# Patient Record
Sex: FEMALE | Race: WHITE | NOT HISPANIC OR LATINO | Employment: OTHER | URBAN - METROPOLITAN AREA
[De-identification: names, ages, dates, MRNs, and addresses within clinical notes are randomized per-mention and may not be internally consistent; named-entity substitution may affect disease eponyms.]

---

## 2019-04-10 ENCOUNTER — HOSPITAL ENCOUNTER (OUTPATIENT)
Dept: PHYSICAL THERAPY | Facility: HOSPITAL | Age: 69
Setting detail: THERAPIES SERIES
Discharge: HOME | End: 2019-04-10

## 2019-04-10 DIAGNOSIS — M54.5 LOW BACK PAIN, UNSPECIFIED BACK PAIN LATERALITY, UNSPECIFIED CHRONICITY, WITH SCIATICA PRESENCE UNSPECIFIED: Primary | ICD-10-CM

## 2019-04-10 PROCEDURE — 97110 THERAPEUTIC EXERCISES: CPT | Performed by: PHYSICAL THERAPIST

## 2019-04-10 PROCEDURE — 97161 PT EVAL LOW COMPLEX 20 MIN: CPT | Performed by: PHYSICAL THERAPIST
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
## 2019-04-15 ENCOUNTER — HOSPITAL ENCOUNTER (OUTPATIENT)
Dept: PHYSICAL THERAPY | Facility: HOSPITAL | Age: 69
Setting detail: THERAPIES SERIES
Discharge: HOME | End: 2019-04-15

## 2019-04-15 DIAGNOSIS — M54.5 LOW BACK PAIN, UNSPECIFIED BACK PAIN LATERALITY, UNSPECIFIED CHRONICITY, WITH SCIATICA PRESENCE UNSPECIFIED: Primary | ICD-10-CM

## 2019-04-15 PROCEDURE — 97035 APP MDLTY 1+ULTRASOUND EA 15: CPT | Performed by: PHYSICAL THERAPIST

## 2019-04-15 PROCEDURE — 97110 THERAPEUTIC EXERCISES: CPT | Performed by: PHYSICAL THERAPIST

## 2019-04-15 NOTE — THERAPY TREATMENT NOTE
Outpatient Physical Therapy Ortho Treatment Note  GINA Marzena Garza     Patient Name: Nereida CUMMINGS  : 1950  MRN: [id number]  Today's Date: 4/15/2019      Visit Date: 04/15/2019    Visit Dx:    ICD-10-CM ICD-9-CM   1. Low back pain, unspecified back pain laterality, unspecified chronicity, with sciatica presence unspecified M54.5 724.2       There is no problem list on file for this patient.       No past medical history on file.     No past surgical history on file.    PT Ortho     Row Name 04/15/19 1250       Subjective Comments    Subjective Comments  Patient reports that she is not currently hurting but is still having difficulty with transfers out of bed.  She states that she is trying to stretch knee to chest prior to getting out of bed and it seems to help a little.  -SP      User Key  (r) = Recorded By, (t) = Taken By, (c) = Cosigned By    Initials Name Provider Type    Taryn Osman, PT Physical Therapist                      PT Assessment/Plan     Row Name 04/15/19 7534          PT Assessment    Assessment Comments  Patient tolerates progression of ther-ex well.  She continues to report difficulty with transfers to stand.  Ultrasound initiated to address muscle tightness at (B) lumbar paraspinals  -SP       User Key  (r) = Recorded By, (t) = Taken By, (c) = Cosigned By    Initials Name Provider Type    Taryn Osman, PT Physical Therapist          Modalities     Row Name 04/15/19 1250             Moist Heat    MH Applied  Yes  -SP      Location  (B) L/S area  -SP      Rx Minutes  12 mins  -SP      MH Prior to Rx  Yes  -SP         Ultrasound 26120    Location  (B) L/S area patient in sidelying  -SP      Duty Cycle  100  -SP      Frequency  1.0 MHz  -SP      Intensity - Wts/cm  1.5  -SP        User Key  (r) = Recorded By, (t) = Taken By, (c) = Cosigned By    Initials Name Provider Type    Taryn Osman, PT Physical Therapist        Exercises     Row Name  04/15/19 1250             Subjective Comments    Subjective Comments  Patient reports that she is not currently hurting but is still having difficulty with transfers out of bed.  She states that she is trying to stretch knee to chest prior to getting out of bed and it seems to help a little.  -SP         Exercise 1    Exercise Name 1  SKTC  -SP      Reps 1  3   -SP      Time 1  20 sec   -SP         Exercise 2    Exercise Name 2  Piriformis stretch  -SP      Reps 2  3  -SP      Time 2  20 sec  -SP         Exercise 3    Exercise Name 3  Hamstring stretch  -SP      Reps 3  3  -SP      Time 3  20 sec  -SP         Exercise 4    Exercise Name 4  DKTC  -SP      Reps 4  3  -SP      Time 4  20 sec  -SP         Exercise 5    Exercise Name 5  PPT   -SP      Reps 5  15  -SP      Time 5  5 sec  -SP         Exercise 6    Exercise Name 6  PPT with ball squeeze  -SP      Reps 6  15  -SP      Time 6  5 sec  -SP         Exercise 7    Exercise Name 7  PPT with BKFO  -SP      Sets 7  2  -SP      Reps 7  10  -SP        User Key  (r) = Recorded By, (t) = Taken By, (c) = Cosigned By    Initials Name Provider Type    Taryn Osman, PT Physical Therapist                           Therapy Education  Given: HEP  Program: Progressed  How Provided: Verbal, Written  Provided to: Patient  Level of Understanding: Verbalized, Demonstrated              Time Calculation:   Start Time: 1250  Stop Time: 1405  Time Calculation (min): 75 min  Therapy Charges for Today     Code Description Service Date Service Provider Modifiers Qty    88906973275 HC PT ULTRASOUND EA 15 MIN 4/15/2019 Taryn Julian, PT GP 1    20029617815 HC PT THER PROC EA 15 MIN 4/15/2019 Taryn Julian, PT GP 1                    Taryn Julian, PT  4/15/2019

## 2019-04-18 ENCOUNTER — HOSPITAL ENCOUNTER (OUTPATIENT)
Dept: PHYSICAL THERAPY | Facility: HOSPITAL | Age: 69
Setting detail: THERAPIES SERIES
Discharge: HOME | End: 2019-04-18

## 2019-04-18 DIAGNOSIS — M54.5 LOW BACK PAIN, UNSPECIFIED BACK PAIN LATERALITY, UNSPECIFIED CHRONICITY, WITH SCIATICA PRESENCE UNSPECIFIED: Primary | ICD-10-CM

## 2019-04-18 PROCEDURE — 97110 THERAPEUTIC EXERCISES: CPT | Performed by: PHYSICAL THERAPIST

## 2019-04-18 PROCEDURE — 97035 APP MDLTY 1+ULTRASOUND EA 15: CPT | Performed by: PHYSICAL THERAPIST

## 2019-04-18 NOTE — THERAPY TREATMENT NOTE
Outpatient Physical Therapy Ortho Treatment Note  GINA Marzena Garza     Patient Name: Nereida CUMMINGS  : 1950  MRN: [id number]  Today's Date: 2019      Visit Date: 2019    Visit Dx:    ICD-10-CM ICD-9-CM   1. Low back pain, unspecified back pain laterality, unspecified chronicity, with sciatica presence unspecified M54.5 724.2       There is no problem list on file for this patient.       No past medical history on file.     No past surgical history on file.    PT Ortho     Row Name 19 4306       Subjective Comments    Subjective Comments  Patient reports that she continues to have pain upon waking and trying to get out of bed.  She states she does knee to chest stretches prior to transfer out of bed and although she still has pain, it is slightly better.  -SP      User Key  (r) = Recorded By, (t) = Taken By, (c) = Cosigned By    Initials Name Provider Type    Taryn Osman, PT Physical Therapist                      PT Assessment/Plan     Row Name 19 1163          PT Assessment    Assessment Comments  Patient tolerates progression of ther-ex.  She continues to have difficulty with PPT due to lumbar area tightness and weakness.  -SP        PT Plan    PT Plan Comments  Continue per POC  -SP       User Key  (r) = Recorded By, (t) = Taken By, (c) = Cosigned By    Initials Name Provider Type    Taryn Osman, PT Physical Therapist          Modalities     Row Name 19 3684             Moist Heat    MH Applied  Yes  -SP      Location  (B) L/S area  -SP      Rx Minutes  12 mins  -SP      MH Prior to Rx  Yes  -SP         Ultrasound 29539    Location  (B) L/S area patient in sidelying  -SP      Duty Cycle  100  -SP      Frequency  1.0 MHz  -SP      Intensity - Wts/cm  1.5  -SP        User Key  (r) = Recorded By, (t) = Taken By, (c) = Cosigned By    Initials Name Provider Type    Taryn Osman, PT Physical Therapist        Exercises     Row Name  04/18/19 1255             Subjective Comments    Subjective Comments  Patient reports that she continues to have pain upon waking and trying to get out of bed.  She states she does knee to chest stretches prior to transfer out of bed and although she still has pain, it is slightly better.  -SP         Exercise 1    Exercise Name 1  SKTC  -SP      Reps 1  3   -SP      Time 1  20 sec   -SP         Exercise 2    Exercise Name 2  Piriformis stretch  -SP      Reps 2  3  -SP      Time 2  20 sec  -SP         Exercise 3    Exercise Name 3  Hamstring stretch  -SP      Reps 3  3  -SP      Time 3  20 sec  -SP         Exercise 4    Exercise Name 4  DKTC  -SP      Reps 4  3  -SP      Time 4  20 sec  -SP         Exercise 5    Exercise Name 5  PPT   -SP      Reps 5  15  -SP      Time 5  5 sec  -SP         Exercise 6    Exercise Name 6  PPT with ball squeeze  -SP      Reps 6  15  -SP      Time 6  5 sec  -SP         Exercise 7    Exercise Name 7  PPT with BKFO  -SP      Sets 7  2  -SP      Reps 7  10  -SP         Exercise 8    Exercise Name 8  seated on je disc: marches  -SP      Reps 8  20  -SP         Exercise 9    Exercise Name 9  PPT vs wall  -SP      Reps 9  10  -SP      Time 9  5 sec  -SP        User Key  (r) = Recorded By, (t) = Taken By, (c) = Cosigned By    Initials Name Provider Type    Taryn Osman, LYLY Physical Therapist                      Manual Rx (last 36 hours)      Manual Treatments     Row Name 04/18/19 1700             Manual Rx 1    Manual Rx 1 Location  right innominate  -SP      Manual Rx 1 Type  MET: pelvic clearing and right innominate correct  -SP        User Key  (r) = Recorded By, (t) = Taken By, (c) = Cosigned By    Initials Name Provider Type    Taryn Osman, LYLY Physical Therapist              Therapy Education  Given: HEP  Program: Progressed  How Provided: Verbal, Written  Provided to: Patient  Level of Understanding: Verbalized, Demonstrated              Time  Calculation:   Start Time: 1255  Stop Time: 1414  Time Calculation (min): 79 min  Therapy Charges for Today     Code Description Service Date Service Provider Modifiers Qty    59399535430 HC PT ULTRASOUND EA 15 MIN 4/18/2019 Taryn Julian, PT GP 1    [id number]  PT THER PROC EA 15 MIN 4/18/2019 Taryn Julian, PT GP 1                    Taryn Julian, PT  4/18/2019

## 2019-04-23 ENCOUNTER — HOSPITAL ENCOUNTER (OUTPATIENT)
Dept: PHYSICAL THERAPY | Facility: HOSPITAL | Age: 69
Setting detail: THERAPIES SERIES
Discharge: HOME | End: 2019-04-23

## 2019-04-23 DIAGNOSIS — M54.5 LOW BACK PAIN, UNSPECIFIED BACK PAIN LATERALITY, UNSPECIFIED CHRONICITY, WITH SCIATICA PRESENCE UNSPECIFIED: Primary | ICD-10-CM

## 2019-04-23 PROCEDURE — 97110 THERAPEUTIC EXERCISES: CPT | Performed by: PHYSICAL THERAPIST

## 2019-04-23 NOTE — THERAPY TREATMENT NOTE
Outpatient Physical Therapy Ortho Treatment Note  GINA [facility]Plainfield     Patient Name: Nereida CUMMINGS  : 1950  MRN: [id number]  Today's Date: 2019      Visit Date: 2019    Visit Dx:    ICD-10-CM ICD-9-CM   1. Low back pain, unspecified back pain laterality, unspecified chronicity, with sciatica presence unspecified M54.5 724.2       There is no problem list on file for this patient.       No past medical history on file.     No past surgical history on file.    PT Ortho     Row Name 19 1300       Subjective Comments    Subjective Comments  Patient reports that she is better able to get out of bed somedays.  She states she good and bad days with the transfers and changes in the weather seem to make it more difficult.   -SP      User Key  (r) = Recorded By, (t) = Taken By, (c) = Cosigned By    Initials Name Provider Type    Taryn Osman, PT Physical Therapist                      PT Assessment/Plan     Row Name 19 193          PT Assessment    Assessment Comments  Patient with subjective reports of improvement in transfer out of bed in am.  Patient exhibits improved technique with pelvic tilt  -SP        PT Plan    PT Plan Comments  Continue per POC  -SP       User Key  (r) = Recorded By, (t) = Taken By, (c) = Cosigned By    Initials Name Provider Type    Taryn Osman, PT Physical Therapist          Modalities     Row Name 19 1300             Moist Heat    MH Applied  Yes  -SP      Location  (B) L/S area  -SP      Rx Minutes  12 mins  -SP      MH Prior to Rx  Yes  -SP         Ultrasound 37793    Location  (B) L/S area patient in sidelying  -SP      Duty Cycle  100  -SP      Frequency  1.0 MHz  -SP      Intensity - Wts/cm  1.5  -SP        User Key  (r) = Recorded By, (t) = Taken By, (c) = Cosigned By    Initials Name Provider Type    Taryn Osman, PT Physical Therapist        Exercises     Row Name 19 1300             Subjective  Comments    Subjective Comments  Patient reports that she is better able to get out of bed somedays.  She states she good and bad days with the transfers and changes in the weather seem to make it more difficult.   -SP         Exercise 1    Exercise Name 1  SKTC  -SP      Reps 1  3   -SP      Time 1  20 sec   -SP         Exercise 2    Exercise Name 2  Piriformis stretch  -SP      Reps 2  3  -SP      Time 2  20 sec  -SP         Exercise 3    Exercise Name 3  Hamstring stretch  -SP      Reps 3  3  -SP      Time 3  20 sec  -SP         Exercise 4    Exercise Name 4  DKTC  -SP      Reps 4  3  -SP      Time 4  20 sec  -SP         Exercise 5    Exercise Name 5  PPT   -SP      Reps 5  15  -SP      Time 5  5 sec  -SP         Exercise 6    Exercise Name 6  PPT with ball squeeze  -SP      Reps 6  15  -SP      Time 6  5 sec  -SP         Exercise 7    Exercise Name 7  PPT with BKFO  -SP      Sets 7  2  -SP      Reps 7  10  -SP         Exercise 8    Exercise Name 8  seated on je disc: marches  -SP      Reps 8  20  -SP         Exercise 9    Exercise Name 9  PPT vs wall  -SP      Reps 9  15  -SP      Time 9  5 sec  -SP        User Key  (r) = Recorded By, (t) = Taken By, (c) = Cosigned By    Initials Name Provider Type    Taryn Osman, LYLY Physical Therapist                      Manual Rx (last 36 hours)      Manual Treatments     Row Name 04/23/19 1300             Manual Rx 1    Manual Rx 1 Location  right innominate  -SP      Manual Rx 1 Type  MET: pelvic clearing and right innominate correct  -SP        User Key  (r) = Recorded By, (t) = Taken By, (c) = Cosigned By    Initials Name Provider Type    Taryn Osman PT Physical Therapist                             Time Calculation:   Start Time: 1300  Stop Time: 1415  Time Calculation (min): 75 min  Therapy Charges for Today     Code Description Service Date Service Provider Modifiers Qty    51913030466  PT THER PROC EA 15 MIN 4/23/2019 Eliel  Taryn Downs, PT GP 1                    Taryn Julian, PT  4/23/2019

## 2019-04-25 ENCOUNTER — HOSPITAL ENCOUNTER (OUTPATIENT)
Dept: PHYSICAL THERAPY | Facility: HOSPITAL | Age: 69
Setting detail: THERAPIES SERIES
Discharge: HOME | End: 2019-04-25

## 2019-04-25 DIAGNOSIS — M54.5 LOW BACK PAIN, UNSPECIFIED BACK PAIN LATERALITY, UNSPECIFIED CHRONICITY, WITH SCIATICA PRESENCE UNSPECIFIED: Primary | ICD-10-CM

## 2019-04-25 PROCEDURE — G0283 ELEC STIM OTHER THAN WOUND: HCPCS | Performed by: PHYSICAL THERAPIST

## 2019-04-25 PROCEDURE — 97110 THERAPEUTIC EXERCISES: CPT | Performed by: PHYSICAL THERAPIST

## 2019-04-25 NOTE — THERAPY TREATMENT NOTE
Outpatient Physical Therapy Ortho Treatment Note  GINA IvanOtis     Patient Name: Nereida CUMMINGS  : 1950  MRN: [id number]  Today's Date: 2019      Visit Date: 2019    Visit Dx:    ICD-10-CM ICD-9-CM   1. Low back pain, unspecified back pain laterality, unspecified chronicity, with sciatica presence unspecified M54.5 724.2       There is no problem list on file for this patient.       No past medical history on file.     No past surgical history on file.    PT Ortho     Row Name 19 1155       Subjective Comments    Subjective Comments  Patient reports that she was sore in low back area after last visit.  She reports that by the next day she felt a lot better.  Patient continues to have intermittent levels of difficulty with transfers out of bed in am.    -SP    Row Name 19 1300       Subjective Comments    Subjective Comments  Patient reports that she is better able to get out of bed somedays.  She states she good and bad days with the transfers and changes in the weather seem to make it more difficult.   -SP      User Key  (r) = Recorded By, (t) = Taken By, (c) = Cosigned By    Initials Name Provider Type    Taryn Osman, PT Physical Therapist                      PT Assessment/Plan     Row Name 19 1845          PT Assessment    Assessment Comments  Patient continues to present with pelvic malalignment that improves well with manual techniques  -SP        PT Plan    PT Plan Comments  Continue per POC  -SP       User Key  (r) = Recorded By, (t) = Taken By, (c) = Cosigned By    Initials Name Provider Type    Taryn Osman, PT Physical Therapist          Modalities     Row Name 19 1155             Moist Heat    MH Applied  Yes  -SP      Location  (B) L/S area  -SP      Rx Minutes  12 mins  -SP      MH Prior to Rx  Yes  -SP         Ultrasound 34866    Location  (B) L/S area patient in sidelying  -SP      Duty Cycle  100  -SP      Frequency  1.0  MHz  -SP      Intensity - Wts/cm  1.5  -SP        User Key  (r) = Recorded By, (t) = Taken By, (c) = Cosigned By    Initials Name Provider Type    Taryn Osman, PT Physical Therapist        Exercises     Row Name 04/25/19 1155             Subjective Comments    Subjective Comments  Patient reports that she was sore in low back area after last visit.  She reports that by the next day she felt a lot better.  Patient continues to have intermittent levels of difficulty with transfers out of bed in am.    -SP         Exercise 1    Exercise Name 1  SKTC  -SP      Reps 1  3   -SP      Time 1  20 sec   -SP         Exercise 2    Exercise Name 2  Piriformis stretch  -SP      Reps 2  3  -SP      Time 2  20 sec  -SP         Exercise 3    Exercise Name 3  Hamstring stretch  -SP      Reps 3  3  -SP      Time 3  20 sec  -SP         Exercise 4    Exercise Name 4  DKTC  -SP      Reps 4  3  -SP      Time 4  20 sec  -SP         Exercise 5    Exercise Name 5  PPT   -SP      Reps 5  15  -SP      Time 5  5 sec  -SP         Exercise 6    Exercise Name 6  PPT with ball squeeze  -SP      Reps 6  15  -SP      Time 6  5 sec  -SP         Exercise 7    Exercise Name 7  PPT with BKFO  -SP      Sets 7  2  -SP      Reps 7  10  -SP         Exercise 8    Exercise Name 8  seated on je disc: marches  -SP      Reps 8  20  -SP         Exercise 9    Exercise Name 9  PPT vs wall  -SP      Reps 9  15  -SP      Time 9  5 sec  -SP        User Key  (r) = Recorded By, (t) = Taken By, (c) = Cosigned By    Initials Name Provider Type    Taryn Osman, PT Physical Therapist                      Manual Rx (last 36 hours)      Manual Treatments     Row Name 04/25/19 1155             Manual Rx 1    Manual Rx 1 Location  right innominate  -SP      Manual Rx 1 Type  MET: pelvic clearing and right innominate correct  -SP        User Key  (r) = Recorded By, (t) = Taken By, (c) = Cosigned By    Initials Name Provider Type    NATHALY Julian  Taryn Downs, PT Physical Therapist                             Time Calculation:   Start Time: 1155  Stop Time: 1310  Time Calculation (min): 75 min  Therapy Charges for Today     Code Description Service Date Service Provider Modifiers Qty    13333037161  PT ELECTRICAL STIM UNATTENDED 4/25/2019 Taryn Julian, PT  1    [id number]  PT THER PROC EA 15 MIN 4/25/2019 Taryn Julian, PT GP 1                    Taryn Julian, PT  4/25/2019

## 2019-04-29 ENCOUNTER — HOSPITAL ENCOUNTER (OUTPATIENT)
Dept: PHYSICAL THERAPY | Facility: HOSPITAL | Age: 69
Setting detail: THERAPIES SERIES
Discharge: HOME | End: 2019-04-29

## 2019-04-29 DIAGNOSIS — M54.5 LOW BACK PAIN, UNSPECIFIED BACK PAIN LATERALITY, UNSPECIFIED CHRONICITY, WITH SCIATICA PRESENCE UNSPECIFIED: Primary | ICD-10-CM

## 2019-04-29 PROCEDURE — 97110 THERAPEUTIC EXERCISES: CPT | Performed by: PHYSICAL THERAPIST

## 2019-04-29 NOTE — THERAPY TREATMENT NOTE
Outpatient Physical Therapy Ortho Treatment Note  GINA Marzena Garza     Patient Name: Nereida CUMMINGS  : 1950  MRN: [id number]  Today's Date: 2019      Visit Date: 2019    Visit Dx:    ICD-10-CM ICD-9-CM   1. Low back pain, unspecified back pain laterality, unspecified chronicity, with sciatica presence unspecified M54.5 724.2       There is no problem list on file for this patient.       No past medical history on file.     No past surgical history on file.    PT Ortho     Row Name 19 1225       Subjective Comments    Subjective Comments  Patient reports that she is noticing overall improvement in symptoms.  She is still having pain with first getting up in am and continues to stretch prior to transfer  -SP      User Key  (r) = Recorded By, (t) = Taken By, (c) = Cosigned By    Initials Name Provider Type    Taryn Osman, PT Physical Therapist                      PT Assessment/Plan     Row Name 19 1343          PT Assessment    Assessment Comments  Patient continues to present with pelvic malalignment that improves well with MET  -SP        PT Plan    PT Plan Comments  Continue per POC  -SP       User Key  (r) = Recorded By, (t) = Taken By, (c) = Cosigned By    Initials Name Provider Type    Taryn Osman, PT Physical Therapist          Modalities     Row Name 19 3653             Moist Heat    MH Applied  Yes  -SP      Location  (B) L/S area  -SP      Rx Minutes  12 mins  -SP      MH Prior to Rx  Yes  -SP         Ultrasound 71655    Location  (B) L/S area patient in sidelying  -SP      Duty Cycle  100  -SP      Frequency  1.0 MHz  -SP      Intensity - Wts/cm  1.5  -SP        User Key  (r) = Recorded By, (t) = Taken By, (c) = Cosigned By    Initials Name Provider Type    Taryn Osman, PT Physical Therapist        Exercises     Row Name 19 1167             Subjective Comments    Subjective Comments  Patient reports that she is  noticing overall improvement in symptoms.  She is still having pain with first getting up in am and continues to stretch prior to transfer  -SP         Exercise 1    Exercise Name 1  SKTC  -SP      Reps 1  3   -SP      Time 1  20 sec   -SP         Exercise 2    Exercise Name 2  Piriformis stretch  -SP      Reps 2  3  -SP      Time 2  20 sec  -SP         Exercise 3    Exercise Name 3  Hamstring stretch  -SP      Reps 3  3  -SP      Time 3  20 sec  -SP         Exercise 4    Exercise Name 4  DKTC  -SP      Reps 4  3  -SP      Time 4  20 sec  -SP         Exercise 5    Exercise Name 5  PPT   -SP      Reps 5  15  -SP      Time 5  5 sec  -SP         Exercise 6    Exercise Name 6  PPT with ball squeeze  -SP      Reps 6  15  -SP      Time 6  5 sec  -SP         Exercise 7    Exercise Name 7  PPT with BKFO  -SP      Sets 7  2  -SP      Reps 7  10  -SP         Exercise 8    Exercise Name 8  seated on je disc: marches  -SP      Reps 8  20  -SP         Exercise 9    Exercise Name 9  PPT vs wall  -SP      Reps 9  20  -SP      Time 9  5 sec  -SP        User Key  (r) = Recorded By, (t) = Taken By, (c) = Cosigned By    Initials Name Provider Type    SP Taryn Julian, PT Physical Therapist                                          Time Calculation:   Start Time: 1150  Stop Time: 1307  Time Calculation (min): 77 min  Therapy Charges for Today     Code Description Service Date Service Provider Modifiers Qty    73118924648  PT THER PROC EA 15 MIN 4/29/2019 Taryn Julian, PT GP 2                    Taryn Julian, PT  4/29/2019

## 2019-05-02 ENCOUNTER — HOSPITAL ENCOUNTER (OUTPATIENT)
Dept: PHYSICAL THERAPY | Facility: HOSPITAL | Age: 69
Setting detail: THERAPIES SERIES
Discharge: HOME | End: 2019-05-02

## 2019-05-02 PROCEDURE — 97110 THERAPEUTIC EXERCISES: CPT | Performed by: PHYSICAL THERAPIST

## 2019-05-02 NOTE — THERAPY TREATMENT NOTE
Outpatient Physical Therapy Ortho Treatment Note   Marzena Garza     Patient Name: Nereida CUMMINGS  : 1950  MRN: [id number]  Today's Date: 2019      Visit Date: 2019    Visit Dx:  No diagnosis found.    There is no problem list on file for this patient.       No past medical history on file.     No past surgical history on file.    PT Ortho     Row Name 19 1230       Subjective Comments    Subjective Comments  Patient reports that she was able to get out of bed this am without any symptoms after she stretched.    -SP      User Key  (r) = Recorded By, (t) = Taken By, (c) = Cosigned By    Initials Name Provider Type    Taryn Osman, PT Physical Therapist                      PT Assessment/Plan     Row Name 19 7299          PT Assessment    Assessment Comments  Patient with decreased pain with getting out of bed today.  Demonstrates good compliance with HEP  -SP        PT Plan    PT Plan Comments  Continue per POC  -SP       User Key  (r) = Recorded By, (t) = Taken By, (c) = Cosigned By    Initials Name Provider Type    Taryn Osman, PT Physical Therapist          Modalities     Row Name 19 1230             Moist Heat    MH Applied  Yes  -SP      Location  (B) L/S area  -SP      Rx Minutes  12 mins  -SP      MH Prior to Rx  Yes  -SP         Ultrasound 13271    Location  (B) L/S area patient in sidelying  -SP      Duty Cycle  100  -SP      Frequency  1.0 MHz  -SP      Intensity - Wts/cm  1.5  -SP        User Key  (r) = Recorded By, (t) = Taken By, (c) = Cosigned By    Initials Name Provider Type    Taryn Osman, PT Physical Therapist        Exercises     Row Name 19 1230             Subjective Comments    Subjective Comments  Patient reports that she was able to get out of bed this am without any symptoms after she stretched.    -SP         Exercise 1    Exercise Name 1  SKTC  -SP      Reps 1  3   -SP      Time 1  20 sec   -SP          Exercise 2    Exercise Name 2  Piriformis stretch  -SP      Reps 2  3  -SP      Time 2  20 sec  -SP         Exercise 3    Exercise Name 3  Hamstring stretch  -SP      Reps 3  3  -SP      Time 3  20 sec  -SP         Exercise 4    Exercise Name 4  DKTC  -SP      Reps 4  3  -SP      Time 4  20 sec  -SP         Exercise 5    Exercise Name 5  PPT   -SP      Reps 5  15  -SP      Time 5  5 sec  -SP         Exercise 6    Exercise Name 6  PPT with ball squeeze  -SP      Reps 6  15  -SP      Time 6  5 sec  -SP         Exercise 7    Exercise Name 7  PPT with BKFO  -SP      Sets 7  2  -SP      Reps 7  10  -SP         Exercise 8    Exercise Name 8  seated on je disc: marches  -SP      Reps 8  20  -SP         Exercise 9    Exercise Name 9  PPT vs wall  -SP      Reps 9  20  -SP      Time 9  5 sec  -SP        User Key  (r) = Recorded By, (t) = Taken By, (c) = Cosigned By    Initials Name Provider Type    Taryn Osman, PT Physical Therapist                      Manual Rx (last 36 hours)      Manual Treatments     Row Name 05/02/19 1230             Manual Rx 1    Manual Rx 1 Location  right innominate  -SP      Manual Rx 1 Type  MET: pelvic clearing and right innominate correct  -SP        User Key  (r) = Recorded By, (t) = Taken By, (c) = Cosigned By    Initials Name Provider Type    Taryn Osman, LYLY Physical Therapist                             Time Calculation:   Start Time: 1230  Stop Time: 1343  Time Calculation (min): 73 min  Therapy Charges for Today     Code Description Service Date Service Provider Modifiers Qty    [id number]  PT THER PROC EA 15 MIN 5/2/2019 Taryn Julian, PT GP 1                    Taryn Julian PT  5/2/2019

## 2019-05-06 ENCOUNTER — HOSPITAL ENCOUNTER (OUTPATIENT)
Dept: PHYSICAL THERAPY | Facility: HOSPITAL | Age: 69
Setting detail: THERAPIES SERIES
Discharge: HOME | End: 2019-05-06

## 2019-05-06 DIAGNOSIS — M54.5 LOW BACK PAIN, UNSPECIFIED BACK PAIN LATERALITY, UNSPECIFIED CHRONICITY, WITH SCIATICA PRESENCE UNSPECIFIED: Primary | ICD-10-CM

## 2019-05-06 PROCEDURE — 97110 THERAPEUTIC EXERCISES: CPT | Performed by: PHYSICAL THERAPIST

## 2019-05-06 PROCEDURE — 97035 APP MDLTY 1+ULTRASOUND EA 15: CPT | Performed by: PHYSICAL THERAPIST

## 2019-05-06 NOTE — THERAPY TREATMENT NOTE
Outpatient Physical Therapy Ortho Treatment Note  GINA Marzena Garza     Patient Name: Nereida CUMMINGS  : 1950  MRN: [id number]  Today's Date: 2019      Visit Date: 2019    Visit Dx:    ICD-10-CM ICD-9-CM   1. Low back pain, unspecified back pain laterality, unspecified chronicity, with sciatica presence unspecified M54.5 724.2       There is no problem list on file for this patient.       No past medical history on file.     No past surgical history on file.    PT Ortho     Row Name 19 1220       Subjective Comments    Subjective Comments  Patient reports that she travelled out of town and was in car and seated in auditorium seat for several hours.  She reports she did become uncomfortable but her symptoms were not as bad as she thought they might be.  She reports that she is feeling better today.    -SP      User Key  (r) = Recorded By, (t) = Taken By, (c) = Cosigned By    Initials Name Provider Type    Taryn Osman, PT Physical Therapist                      PT Assessment/Plan     Row Name 19 1321          PT Assessment    Assessment Comments  Patient tolerates exercise progression without difficulty.  Demonstrates good technique with PPT  -SP        PT Plan    PT Plan Comments  Continue per POC  -SP       User Key  (r) = Recorded By, (t) = Taken By, (c) = Cosigned By    Initials Name Provider Type    Taryn Osman, PT Physical Therapist          Modalities     Row Name 19 1229             Moist Heat    Location  (B) L/S area  -SP      Rx Minutes  12 mins  -SP      MH Prior to Rx  Yes  -SP         Ultrasound 76575    Location  (B) L/S area patient in sidelying  -SP      Duty Cycle  100  -SP      Frequency  1.0 MHz  -SP      Intensity - Wts/cm  1.5  -SP        User Key  (r) = Recorded By, (t) = Taken By, (c) = Cosigned By    Initials Name Provider Type    Taryn Osman, PT Physical Therapist        Exercises     Row Name 19 1595              Subjective Comments    Subjective Comments  Patient reports that she travelled out of town and was in car and seated in auditorium seat for several hours.  She reports she did become uncomfortable but her symptoms were not as bad as she thought they might be.  She reports that she is feeling better today.    -SP         Exercise 1    Exercise Name 1  SKTC  -SP      Reps 1  3   -SP      Time 1  20 sec   -SP         Exercise 2    Exercise Name 2  Piriformis stretch  -SP      Reps 2  3  -SP      Time 2  20 sec  -SP         Exercise 3    Exercise Name 3  Hamstring stretch  -SP      Reps 3  3  -SP      Time 3  20 sec  -SP         Exercise 4    Exercise Name 4  DKTC  -SP      Reps 4  3  -SP      Time 4  20 sec  -SP         Exercise 5    Exercise Name 5  PPT   -SP      Reps 5  15  -SP      Time 5  5 sec  -SP         Exercise 6    Exercise Name 6  PPT with ball squeeze  -SP      Reps 6  15  -SP      Time 6  5 sec  -SP         Exercise 7    Exercise Name 7  PPT with BKFO  -SP      Sets 7  2  -SP      Reps 7  10  -SP         Exercise 8    Exercise Name 8  seated on je disc: marches  -SP      Reps 8  20  -SP         Exercise 9    Exercise Name 9  PPT vs wall  -SP      Reps 9  20  -SP      Time 9  5 sec  -SP        User Key  (r) = Recorded By, (t) = Taken By, (c) = Cosigned By    Initials Name Provider Type    Taryn Osman, LYLY Physical Therapist                      Manual Rx (last 36 hours)      Manual Treatments     Row Name 05/06/19 1220             Manual Rx 1    Manual Rx 1 Location  right innominate  -SP      Manual Rx 1 Type  MET: pelvic clearing and right innominate correct  -SP        User Key  (r) = Recorded By, (t) = Taken By, (c) = Cosigned By    Initials Name Provider Type    Taryn Osman, LYLY Physical Therapist                             Time Calculation:   Start Time: 1220  Stop Time: 1322  Time Calculation (min): 62 min  Therapy Charges for Today     Code Description  Service Date Service Provider Modifiers Qty    65262499925 HC PT ULTRASOUND EA 15 MIN 5/6/2019 Taryn Julian, PT GP 1    [id number] HC PT THER PROC EA 15 MIN 5/6/2019 Taryn Julian, PT GP 1                    Taryn Julian, PT  5/6/2019

## 2019-05-09 ENCOUNTER — HOSPITAL ENCOUNTER (OUTPATIENT)
Dept: PHYSICAL THERAPY | Facility: HOSPITAL | Age: 69
Setting detail: THERAPIES SERIES
Discharge: HOME | End: 2019-05-09

## 2019-05-09 DIAGNOSIS — M54.5 LOW BACK PAIN, UNSPECIFIED BACK PAIN LATERALITY, UNSPECIFIED CHRONICITY, WITH SCIATICA PRESENCE UNSPECIFIED: Primary | ICD-10-CM

## 2019-05-09 PROCEDURE — 97110 THERAPEUTIC EXERCISES: CPT | Performed by: PHYSICAL THERAPIST

## 2019-05-09 NOTE — THERAPY TREATMENT NOTE
Outpatient Physical Therapy Ortho Treatment Note  GINA Marzena Garza     Patient Name: Nereida CUMMINGS  : 1950  MRN: [id number]  Today's Date: 2019      Visit Date: 2019    Visit Dx:    ICD-10-CM ICD-9-CM   1. Low back pain, unspecified back pain laterality, unspecified chronicity, with sciatica presence unspecified M54.5 724.2       There is no problem list on file for this patient.       No past medical history on file.     No past surgical history on file.    PT Ortho     Row Name 19 1220       Subjective Comments    Subjective Comments  Patient reports that her back continues to be better overall but did have some stiffness today.  She will be traveling out of town next week.  -SP      User Key  (r) = Recorded By, (t) = Taken By, (c) = Cosigned By    Initials Name Provider Type    Taryn Osman, PT Physical Therapist                      PT Assessment/Plan     Row Name 19 193          PT Assessment    Assessment Comments  Patient completes ther-ex with increased repetitions.  Patient demonstrates good compliance with HEP  -SP        PT Plan    PT Plan Comments  Patient to travel out of town next week and follow up upon return.  -SP       User Key  (r) = Recorded By, (t) = Taken By, (c) = Cosigned By    Initials Name Provider Type    Taryn Osman, PT Physical Therapist          Modalities     Row Name 19 1220             Moist Heat    MH Applied  Yes  -SP      Location  (B) L/S area  -SP      Rx Minutes  12 mins  -SP      MH Prior to Rx  Yes  -SP         Ultrasound 07733    Location  (B) L/S area patient in sidelying  -SP      Duty Cycle  100  -SP      Frequency  1.0 MHz  -SP      Intensity - Wts/cm  1.5  -SP        User Key  (r) = Recorded By, (t) = Taken By, (c) = Cosigned By    Initials Name Provider Type    Taryn Osman, PT Physical Therapist        Exercises     Row Name 19 1220             Subjective Comments    Subjective  Comments  Patient reports that her back continues to be better overall but did have some stiffness today.  She will be traveling out of town next week.  -SP         Exercise 1    Exercise Name 1  SKTC  -SP      Reps 1  3   -SP      Time 1  20 sec   -SP         Exercise 2    Exercise Name 2  Piriformis stretch  -SP      Reps 2  3  -SP      Time 2  20 sec  -SP         Exercise 3    Exercise Name 3  Hamstring stretch  -SP      Reps 3  3  -SP      Time 3  20 sec  -SP         Exercise 4    Exercise Name 4  DKTC  -SP      Reps 4  3  -SP      Time 4  20 sec  -SP         Exercise 5    Exercise Name 5  PPT   -SP      Reps 5  15  -SP      Time 5  5 sec  -SP         Exercise 6    Exercise Name 6  PPT with ball squeeze  -SP      Reps 6  20  -SP      Time 6  5 sec  -SP         Exercise 7    Exercise Name 7  PPT with BKFO  -SP      Sets 7  2  -SP      Reps 7  15  -SP         Exercise 8    Exercise Name 8  seated on ej disc: marches  -SP      Reps 8  25  -SP         Exercise 9    Exercise Name 9  PPT vs wall  -SP      Reps 9  25  -SP      Time 9  5 sec  -SP        User Key  (r) = Recorded By, (t) = Taken By, (c) = Cosigned By    Initials Name Provider Type    Taryn Osman, LYLY Physical Therapist                      Manual Rx (last 36 hours)      Manual Treatments     Row Name 05/09/19 1220             Manual Rx 1    Manual Rx 1 Location  right innominate  -SP      Manual Rx 1 Type  MET: pelvic clearing and right innominate correct  -SP        User Key  (r) = Recorded By, (t) = Taken By, (c) = Cosigned By    Initials Name Provider Type    Taryn Osman, PT Physical Therapist              Therapy Education  Given: HEP  Program: Reinforced  How Provided: Verbal  Provided to: Patient  Level of Understanding: Verbalized, Demonstrated              Time Calculation:   Start Time: 1220  Stop Time: 1325  Time Calculation (min): 65 min  Therapy Charges for Today     Code Description Service Date Service  Provider Modifiers Qty    47711574930  PT THER PROC EA 15 MIN 5/9/2019 Taryn Julian, PT GP 1                    Taryn Julian, PT  5/9/2019

## 2019-05-28 ENCOUNTER — HOSPITAL ENCOUNTER (OUTPATIENT)
Dept: PHYSICAL THERAPY | Facility: HOSPITAL | Age: 69
Setting detail: THERAPIES SERIES
Discharge: HOME | End: 2019-05-28

## 2019-05-28 DIAGNOSIS — M54.5 LOW BACK PAIN, UNSPECIFIED BACK PAIN LATERALITY, UNSPECIFIED CHRONICITY, WITH SCIATICA PRESENCE UNSPECIFIED: Primary | ICD-10-CM

## 2019-05-28 PROCEDURE — 97110 THERAPEUTIC EXERCISES: CPT | Performed by: PHYSICAL THERAPIST

## 2019-05-30 ENCOUNTER — HOSPITAL ENCOUNTER (OUTPATIENT)
Dept: PHYSICAL THERAPY | Facility: HOSPITAL | Age: 69
Setting detail: THERAPIES SERIES
Discharge: HOME | End: 2019-05-30

## 2019-05-30 DIAGNOSIS — M54.5 LOW BACK PAIN, UNSPECIFIED BACK PAIN LATERALITY, UNSPECIFIED CHRONICITY, WITH SCIATICA PRESENCE UNSPECIFIED: Primary | ICD-10-CM

## 2019-05-30 PROCEDURE — 97110 THERAPEUTIC EXERCISES: CPT | Performed by: PHYSICAL THERAPIST

## 2019-05-30 NOTE — THERAPY TREATMENT NOTE
Outpatient Physical Therapy Ortho Treatment Note  [facility] [place]     Patient Name: Nereida CUMMINGS  : 1950  MRN: [id number]  Today's Date: 2019      Visit Date: 2019    Visit Dx:    ICD-10-CM ICD-9-CM   1. Low back pain, unspecified back pain laterality, unspecified chronicity, with sciatica presence unspecified M54.5 724.2       There is no problem list on file for this patient.       No past medical history on file.     No past surgical history on file.    PT Ortho     Row Name 19 1225       Subjective Comments    Subjective Comments  Patient reports that she was very sore following last visit.  She reports that her low back felt tight and achy.  She is still sore today but not as bad.   -SP    Row Name 19 1230       Subjective Comments    Subjective Comments  Patient reports that she has been traveling out of town to [place] [employer] which involved significant amount of walking.  She reports that she did have some increased in low back symptoms with the increased walking.  She also had increased pain with riding in car.  Following prolonged sit in car, she had difficulty with initial stand.  Patient states that prior to this trip, she was having less pain with transfers out of bed.  She is having more pain with this transfer OOB.  Patient states that when she is consistent with exercises and stretches prior to getting out of bed, she has less pain. Patient reports that while traveling her pain level increased to 7/10  -SP       Posture/Observations    Forward Head  Mild  -SP    Cervical Lordosis  Decreased  -SP    Thoracic Kyphosis  -- mild CT junction kyphosis; flattened thoracic  -SP    Lumbar lordosis  Decreased  -SP    Iliac crests  Bilateral:;Normal  -SP    Posture/Observations Comments  Hyper mobile at L1-L2 level.  Decreased mobility L2-L5  -SP       Neural Tension Signs- Lower Quarter Clearing    SLR  Bilateral:;Negative  -SP       Sensory Screen for Light Touch- Lower Quarter  Clearing    L1 (inguinal area)  Bilateral:;Intact  -SP    L2 (anterior mid thigh)  Bilateral:;Intact  -SP    L3 (distal anterior thigh)  Bilateral:;Intact  -SP    L4 (medial lower leg/foot)  Bilateral:;Intact  -SP    L5 (lateral lower leg/great toe)  Right:;Diminished  -SP    S1 (bottom of foot)  Right:;Diminished  -SP       Myotomal Screen- Lower Quarter Clearing    Hip flexion (L2)  Bilateral:;4+ (Good +)  -SP    Knee extension (L3)  Bilateral:;4+ (Good +)  -SP    Ankle DF (L4)  Bilateral:;4+ (Good +)  -SP    Great toe extension (L5)  Bilateral:;4+ (Good +)  -SP    Ankle PF (S1)  Bilateral:;4+ (Good +)  -SP    Knee flexion (S2)  Bilateral:;4+ (Good +)  -SP       SI/Hip Screen- Lower Quarter Clearing    Rhonda's/Adam's test  Right:;Positive  -SP       Lumbar/SI Special Tests    SLR (Neural Tension)  Bilateral:;Negative  -SP       Lumbosacral Palpation    Lumbosacral Segment  Bilateral:;Tender;Guarded/taut  -SP    Erector Spinae (Paraspinals)  Bilateral:;Guarded/taut;Tender  -SP       Head/Neck/Trunk    Trunk Extension AROM  decreased by 50% from full range  -SP    Trunk Flexion AROM  decreased by 25% from full range with complaints of tightness and pain in lumbar paraspinal area  -SP    Trunk Lt Lateral Flexion AROM  decreased by 50% from full range  -SP    Trunk Rt Lateral Flexion AROM  decreased by 50% from full range with right L/S area pain at end range  -SP    Trunk Lt Rotation AROM  decreased by 50% from full range with reports of tightness at end range  -SP    Trunk Rt Rotation AROM  decreased by 50% from full range with reports of tightness at end range  -SP       MMT Neck/Trunk    Trunk Flexion MMT, Gross Movement  (3+/5) fair plus  -SP    Left Pelvic Elevation MMT, Gross Movement  (2+/5) poor plus  -SP    Right Pelvic Elevation MMT, Gross Movement:  (2+/5) poor plus  -SP    Neck/Trunk Comments   improved pelvic but unable to maintain with hip flexion  -SP       Sensation    Sensation WNL?  WFL  -SP        Lower Extremity Flexibility    Hamstrings  Bilateral:;Mildly limited  -SP    ITB  Right:;Mildly limited  -SP    Hip External Rotators  Bilateral:;Mildly limited  -SP    Hip Internal Rotators  Bilateral:;Mildly limited  -SP       Transfers    Sit-Stand [place] (Transfers)  independent  -SP    Stand-Sit [place] (Transfers)  independent  -SP       Gait/Stairs Assessment/Training    [place] Level (Gait)  independent  -SP    Pattern (Gait)  swing-through  -SP    Bilateral Gait Deviations  forward flexed posture  -SP      User Key  (r) = Recorded By, (t) = Taken By, (c) = Cosigned By    Initials Name Provider Type    Taryn Osman, PT Physical Therapist                      PT Assessment/Plan     Row Name 05/30/19 2570          PT Assessment    Assessment Comments  Ther-ex modified and patient reports decrease pain following treatment  -SP        PT Plan    PT Plan Comments  Continue to progress trunk strengthening as tolerable  -SP       User Key  (r) = Recorded By, (t) = Taken By, (c) = Cosigned By    Initials Name Provider Type    Taryn Osman, PT Physical Therapist          Modalities     Row Name 05/30/19 1225             Moist Heat    MH Applied  Yes  -SP      Location  (B) L/S area  -SP      Rx Minutes  12 mins  -SP      MH Prior to Rx  Yes  -SP         Ultrasound 39211    Location  (B) L/S area patient in sidelying  -SP      Duty Cycle  100  -SP      Frequency  1.0 MHz  -SP      Intensity - Wts/cm  1.5  -SP        User Key  (r) = Recorded By, (t) = Taken By, (c) = Cosigned By    Initials Name Provider Type    Taryn Osman, PT Physical Therapist        Exercises     Row Name 05/30/19 1225             Subjective Comments    Subjective Comments  Patient reports that she was very sore following last visit.  She reports that her low back felt tight and achy.  She is still sore today but not as bad.   -SP         Exercise 1    Exercise Name 1  [facility]  -SP       Reps 1  3   -SP      Time 1  20 sec   -SP         Exercise 2    Exercise Name 2  Piriformis stretch  -SP      Reps 2  3  -SP      Time 2  20 sec  -SP         Exercise 3    Exercise Name 3  Hamstring stretch  -SP      Reps 3  3  -SP      Time 3  20 sec  -SP         Exercise 4    Exercise Name 4  DKTC  -SP      Reps 4  3  -SP      Time 4  20 sec  -SP         Exercise 5    Exercise Name 5  PPT   -SP      Reps 5  15  -SP      Time 5  5 sec  -SP         Exercise 6    Exercise Name 6  PPT with ball squeeze  -SP      Reps 6  20  -SP      Time 6  5 sec  -SP         Exercise 7    Exercise Name 7  PPT with BKFO  -SP      Sets 7  2  -SP      Reps 7  15  -SP         Exercise 8    Exercise Name 8  seated on je disc: marches  -SP         Exercise 9    Exercise Name 9  PPT vs wall  -SP        User Key  (r) = Recorded By, (t) = Taken By, (c) = Cosigned By    Initials Name Provider Type    Taryn Osman, PT Physical Therapist                      Manual Rx (last 36 hours)      Manual Treatments     Row Name 05/30/19 1225             Manual Rx 1    Manual Rx 1 Location  right innominate  -SP      Manual Rx 1 Type  MET: pelvic clearing and right innominate correct  -SP        User Key  (r) = Recorded By, (t) = Taken By, (c) = Cosigned By    Initials Name Provider Type    Taryn Osman, LYLY Physical Therapist                             Time Calculation:   Start Time: 1225  Stop Time: 1340  Time Calculation (min): 75 min  Therapy Charges for Today     Code Description Service Date Service Provider Modifiers Qty    30509741807 HC PT THER PROC EA 15 MIN 5/30/2019 Taryn Julian, PT GP 1                    Taryn Julian PT  5/30/2019

## 2019-06-03 ENCOUNTER — HOSPITAL ENCOUNTER (OUTPATIENT)
Dept: PHYSICAL THERAPY | Facility: HOSPITAL | Age: 69
Setting detail: THERAPIES SERIES
Discharge: HOME | End: 2019-06-03

## 2019-06-03 DIAGNOSIS — M54.5 LOW BACK PAIN, UNSPECIFIED BACK PAIN LATERALITY, UNSPECIFIED CHRONICITY, WITH SCIATICA PRESENCE UNSPECIFIED: Primary | ICD-10-CM

## 2019-06-03 PROCEDURE — 97110 THERAPEUTIC EXERCISES: CPT | Performed by: PHYSICAL THERAPIST

## 2019-06-03 NOTE — THERAPY TREATMENT NOTE
Outpatient Physical Therapy Ortho Treatment Note  [facility]     Patient Name: Nereida CUMMINGS  : 1950  MRN: [id number]  Today's Date: 6/3/2019      Visit Date: 2019    Visit Dx:    ICD-10-CM ICD-9-CM   1. Low back pain, unspecified back pain laterality, unspecified chronicity, with sciatica presence unspecified M54.5 724.2       There is no problem list on file for this patient.       No past medical history on file.     No past surgical history on file.    PT Ortho     Row Name 19 1220       Subjective Comments    Subjective Comments  Patient reports that she did have soreness in low back after last visit but it was not as bad as it had been.  She reports that she did yard work over the weekend and it also increased her soreness.  -SP      User Key  (r) = Recorded By, (t) = Taken By, (c) = Cosigned By    Initials Name Provider Type    Taryn Osman, PT Physical Therapist                      PT Assessment/Plan     Row Name 19 1448 19 1402       PT Assessment    Assessment Comments  --  Patient tolerates progression of ther-ex well.  She continues to present with pelvic malalignment that corrects well with MET  -SP       PT Plan    PT Plan Comments  Continue to progress trunk strengthening as tolerable  -SP  --      User Key  (r) = Recorded By, (t) = Taken By, (c) = Cosigned By    Initials Name Provider Type    Taryn Osman, PT Physical Therapist          Modalities     Row Name 19 1220             Moist Heat    MH Applied  Yes  -SP      Location  (B) L/S area  -SP      Rx Minutes  12 mins  -SP      MH Prior to Rx  Yes  -SP         Ultrasound 90709    Location  (B) L/S area patient in sidelying  -SP      Duty Cycle  100  -SP      Frequency  1.0 MHz  -SP      Intensity - Wts/cm  1.5  -SP        User Key  (r) = Recorded By, (t) = Taken By, (c) = Cosigned By    Initials Name Provider Type    Taryn Osman, PT Physical Therapist         Exercises     Row Name 06/03/19 1220             Subjective Comments    Subjective Comments  Patient reports that she did have soreness in low back after last visit but it was not as bad as it had been.  She reports that she did yard work over the weekend and it also increased her soreness.  -SP         Exercise 1    Exercise Name 1  SKTC  -SP      Reps 1  3   -SP      Time 1  20 sec   -SP         Exercise 2    Exercise Name 2  Piriformis stretch  -SP      Reps 2  3  -SP      Time 2  20 sec  -SP         Exercise 3    Exercise Name 3  Hamstring stretch  -SP      Reps 3  3  -SP      Time 3  20 sec  -SP         Exercise 4    Exercise Name 4  DKTC  -SP      Reps 4  3  -SP      Time 4  20 sec  -SP         Exercise 5    Exercise Name 5  PPT   -SP      Reps 5  15  -SP      Time 5  5 sec  -SP         Exercise 6    Exercise Name 6  PPT with ball squeeze  -SP      Reps 6  20  -SP      Time 6  5 sec  -SP         Exercise 7    Exercise Name 7  PPT with BKFO  -SP      Sets 7  2  -SP      Reps 7  15  -SP         Exercise 8    Exercise Name 8  seated on je disc: marches  -SP      Reps 8  20  -SP         Exercise 9    Exercise Name 9  PPT vs wall  -SP      Reps 9  20  -SP      Time 9  5 sec  -SP        User Key  (r) = Recorded By, (t) = Taken By, (c) = Cosigned By    Initials Name Provider Type    Taryn Osman, LYLY Physical Therapist                      Manual Rx (last 36 hours)      Manual Treatments     Row Name 06/03/19 1220             Manual Rx 1    Manual Rx 1 Location  right innominate  -SP      Manual Rx 1 Type  MET: pelvic clearing and right innominate correct  -SP        User Key  (r) = Recorded By, (t) = Taken By, (c) = Cosigned By    Initials Name Provider Type    Taryn Osman, LYLY Physical Therapist                             Time Calculation:   Start Time: 1220  Stop Time: 1334  Time Calculation (min): 74 min  Therapy Charges for Today     Code Description Service Date Service  Provider Modifiers Qty    89439256097  PT THER PROC EA 15 MIN 6/3/2019 Taryn Julian, PT GP 1                    Taryn Julian, PT  6/3/2019

## 2019-06-10 ENCOUNTER — HOSPITAL ENCOUNTER (OUTPATIENT)
Dept: PHYSICAL THERAPY | Facility: HOSPITAL | Age: 69
Setting detail: THERAPIES SERIES
Discharge: HOME | End: 2019-06-10

## 2019-06-10 DIAGNOSIS — M54.5 LOW BACK PAIN, UNSPECIFIED BACK PAIN LATERALITY, UNSPECIFIED CHRONICITY, WITH SCIATICA PRESENCE UNSPECIFIED: Primary | ICD-10-CM

## 2019-06-10 PROCEDURE — 97110 THERAPEUTIC EXERCISES: CPT | Performed by: PHYSICAL THERAPIST

## 2019-06-10 NOTE — THERAPY TREATMENT NOTE
Outpatient Physical Therapy Ortho Treatment Note  [facility]     Patient Name: Nereida CUMMINGS  : 1950  MRN: [id number]  Today's Date: 6/10/2019      Visit Date: 06/10/2019    Visit Dx:    ICD-10-CM ICD-9-CM   1. Low back pain, unspecified back pain laterality, unspecified chronicity, with sciatica presence unspecified M54.5 724.2       There is no problem list on file for this patient.       No past medical history on file.     No past surgical history on file.    PT Ortho     Row Name 06/10/19 1230       Subjective Comments    Subjective Comments  Patient reports that she is feeling better today.   Patient states that she continues to stretch at home and is much better able to transfer out of bed in am if she stretches first.    -SP      User Key  (r) = Recorded By, (t) = Taken By, (c) = Cosigned By    Initials Name Provider Type    Taryn Osman, PT Physical Therapist                      PT Assessment/Plan     Row Name 06/10/19 1674          PT Assessment    Assessment Comments  Patient with decreasing complaints of pain with exercise and she is demonstrating good compliance with HEP  -SP        PT Plan    PT Plan Comments  Continue per POC  -SP       User Key  (r) = Recorded By, (t) = Taken By, (c) = Cosigned By    Initials Name Provider Type    Taryn Osman, PT Physical Therapist          Modalities     Row Name 06/10/19 1230             Moist Heat    MH Applied  Yes  -SP      Location  (B) L/S area  -SP      Rx Minutes  12 mins  -SP      MH Prior to Rx  Yes  -SP         Ultrasound [id number]    Location  (B) L/S area patient in sidelying  -SP      Duty Cycle  100  -SP      Frequency  1.0 MHz  -SP      Intensity - Wts/cm  1.5  -SP        User Key  (r) = Recorded By, (t) = Taken By, (c) = Cosigned By    Initials Name Provider Type    Taryn Osman, PT Physical Therapist        Exercises     Row Name 06/10/19 1230             Subjective Comments    Subjective  Comments  Patient reports that she is feeling better today.   Patient states that she continues to stretch at home and is much better able to transfer out of bed in am if she stretches first.    -SP         Exercise 1    Exercise Name 1  SKTC  -SP      Reps 1  3   -SP      Time 1  20 sec   -SP         Exercise 2    Exercise Name 2  Piriformis stretch  -SP      Reps 2  3  -SP      Time 2  20 sec  -SP         Exercise 3    Exercise Name 3  Hamstring stretch  -SP      Reps 3  3  -SP      Time 3  20 sec  -SP         Exercise 4    Exercise Name 4  DKTC  -SP      Reps 4  3  -SP      Time 4  20 sec  -SP         Exercise 5    Exercise Name 5  PPT   -SP      Reps 5  15  -SP      Time 5  5 sec  -SP         Exercise 6    Exercise Name 6  PPT with ball squeeze  -SP      Reps 6  20  -SP      Time 6  5 sec  -SP         Exercise 7    Exercise Name 7  PPT with BKFO  -SP      Sets 7  2  -SP      Reps 7  15  -SP         Exercise 8    Exercise Name 8  seated on je disc: marches  -SP      Reps 8  20  -SP         Exercise 9    Exercise Name 9  PPT vs wall  -SP      Reps 9  20  -SP      Time 9  5 sec  -SP        User Key  (r) = Recorded By, (t) = Taken By, (c) = Cosigned By    Initials Name Provider Type    Taryn Osman, LYLY Physical Therapist                      Manual Rx (last 36 hours)      Manual Treatments     Row Name 06/10/19 1230             Manual Rx 1    Manual Rx 1 Location  right innominate  -SP      Manual Rx 1 Type  MET: pelvic clearing and right innominate correct  -SP        User Key  (r) = Recorded By, (t) = Taken By, (c) = Cosigned By    Initials Name Provider Type    Taryn Osman, LYLY Physical Therapist              Therapy Education  Given: HEP  Program: Reinforced  How Provided: Verbal  Provided to: Patient  Level of Understanding: Verbalized, Demonstrated              Time Calculation:   Start Time: 1230  Stop Time: 1340  Time Calculation (min): 70 min  Therapy Charges for Today      Code Description Service Date Service Provider Modifiers Qty    78572100382 HC PT THER PROC EA 15 MIN 6/10/2019 Taryn Julian, PT GP 1                    Taryn Julian, PT  6/10/2019      No deformity or limitation of movement

## 2019-06-17 ENCOUNTER — HOSPITAL ENCOUNTER (OUTPATIENT)
Dept: PHYSICAL THERAPY | Facility: HOSPITAL | Age: 69
Setting detail: THERAPIES SERIES
Discharge: HOME | End: 2019-06-17

## 2019-06-17 DIAGNOSIS — M54.5 LOW BACK PAIN, UNSPECIFIED BACK PAIN LATERALITY, UNSPECIFIED CHRONICITY, WITH SCIATICA PRESENCE UNSPECIFIED: Primary | ICD-10-CM

## 2019-06-17 PROCEDURE — 97110 THERAPEUTIC EXERCISES: CPT | Performed by: PHYSICAL THERAPIST

## 2019-06-17 NOTE — THERAPY TREATMENT NOTE
Outpatient Physical Therapy Ortho Treatment Note  [facility]     Patient Name: Nereida CUMMINGS  : 1950  MRN: [id number]  Today's Date: 2019      Visit Date: 2019    Visit Dx:    ICD-10-CM ICD-9-CM   1. Low back pain, unspecified back pain laterality, unspecified chronicity, with sciatica presence unspecified M54.5 724.2       There is no problem list on file for this patient.       No past medical history on file.     No past surgical history on file.    PT Ortho     Row Name 19 1200       Subjective Comments    Subjective Comments  Patient reports that she is doing better and symptoms are more intermittent.  She was able to get out of bed without stretching and did not have increased pain.  However she states that she typically does stretch and it helps.    -SP      User Key  (r) = Recorded By, (t) = Taken By, (c) = Cosigned By    Initials Name Provider Type    Taryn Osman, PT Physical Therapist                      PT Assessment/Plan     Row Name 19 1335          PT Assessment    Assessment Comments  Patient with consistent reports of symptoms management with exercise.  -SP        PT Plan    PT Plan Comments  Patient to travel out of town and follow up as needed.  -SP       User Key  (r) = Recorded By, (t) = Taken By, (c) = Cosigned By    Initials Name Provider Type    Taryn Osman, PT Physical Therapist          Modalities     Row Name 19 1200             Moist Heat    MH Applied  Yes  -SP      Location  (B) L/S area  -SP      Rx Minutes  12 mins  -SP      MH Prior to Rx  Yes  -SP         Ultrasound 88493    Location  (B) L/S area patient in sidelying  -SP      Duty Cycle  100  -SP      Frequency  1.0 MHz  -SP      Intensity - Wts/cm  1.5  -SP        User Key  (r) = Recorded By, (t) = Taken By, (c) = Cosigned By    Initials Name Provider Type    Taryn Osman, PT Physical Therapist        Exercises     Row Name 19 1200              Subjective Comments    Subjective Comments  Patient reports that she is doing better and symptoms are more intermittent.  She was able to get out of bed without stretching and did not have increased pain.  However she states that she typically does stretch and it helps.    -SP         Exercise 1    Exercise Name 1  SKTC  -SP      Reps 1  3   -SP      Time 1  20 sec   -SP         Exercise 2    Exercise Name 2  Piriformis stretch  -SP      Reps 2  3  -SP      Time 2  20 sec  -SP         Exercise 3    Exercise Name 3  Hamstring stretch  -SP      Reps 3  3  -SP      Time 3  20 sec  -SP         Exercise 4    Exercise Name 4  DKTC  -SP      Reps 4  3  -SP      Time 4  20 sec  -SP         Exercise 5    Exercise Name 5  PPT   -SP      Reps 5  15  -SP      Time 5  5 sec  -SP         Exercise 6    Exercise Name 6  PPT with ball squeeze  -SP      Reps 6  20  -SP      Time 6  5 sec  -SP         Exercise 7    Exercise Name 7  PPT with BKFO  -SP      Sets 7  2  -SP      Reps 7  15  -SP      Time 7  black  -SP         Exercise 8    Exercise Name 8  seated on je disc: marches  -SP      Reps 8  20  -SP         Exercise 9    Exercise Name 9  PPT vs wall  -SP      Reps 9  20  -SP      Time 9  5 sec  -SP         Exercise 10    Exercise Name 10  PPT with marches in hooklying   -SP      Reps 10  10  -SP        User Key  (r) = Recorded By, (t) = Taken By, (c) = Cosigned By    Initials Name Provider Type    Taryn Osman, PT Physical Therapist                      Manual Rx (last 36 hours)      Manual Treatments     Row Name 06/17/19 1200             Manual Rx 1    Manual Rx 1 Location  right innominate  -SP      Manual Rx 1 Type  MET: pelvic clearing and right innominate correct  -SP        User Key  (r) = Recorded By, (t) = Taken By, (c) = Cosigned By    Initials Name Provider Type    Taryn Osman, PT Physical Therapist              Therapy Education  Given: HEP  Program: Reinforced  How  Provided: Verbal  Provided to: Patient  Level of Understanding: Verbalized, Demonstrated              Time Calculation:   Start Time: 1200  Stop Time: 1305  Time Calculation (min): 65 min  Therapy Charges for Today     Code Description Service Date Service Provider Modifiers Qty    42329319345 HC PT THER PROC EA 15 MIN 6/17/2019 Taryn Julian, PT GP 2                    Taryn Julian, PT  6/17/2019

## 2021-03-11 ENCOUNTER — IMMUNIZATION (OUTPATIENT)
Dept: VACCINE CLINIC | Facility: HOSPITAL | Age: 71
End: 2021-03-11

## 2021-03-11 PROCEDURE — 91300 HC SARSCOV02 VAC 30MCG/0.3ML IM: CPT | Performed by: OBSTETRICS & GYNECOLOGY

## 2021-03-11 PROCEDURE — 0001A: CPT | Performed by: OBSTETRICS & GYNECOLOGY

## 2021-04-01 ENCOUNTER — IMMUNIZATION (OUTPATIENT)
Dept: VACCINE CLINIC | Facility: HOSPITAL | Age: 71
End: 2021-04-01

## 2021-04-01 PROCEDURE — 0002A: CPT | Performed by: OBSTETRICS & GYNECOLOGY

## 2021-04-01 PROCEDURE — 91300 HC SARSCOV02 VAC 30MCG/0.3ML IM: CPT | Performed by: OBSTETRICS & GYNECOLOGY

## 2021-10-27 ENCOUNTER — TELEPHONE (OUTPATIENT)
Dept: GASTROENTEROLOGY | Facility: CLINIC | Age: 71
End: 2021-10-27

## 2021-10-28 NOTE — TELEPHONE ENCOUNTER
SPOKE WITH PATIENT.  LAST COLONOSCOPY 7-8 YEARS DR. PETERSON.  EXPLAINED ABOUT FAST TRACK.  VERIFY ADDRESS.  MAILED TODAY.

## 2021-11-02 ENCOUNTER — TELEPHONE (OUTPATIENT)
Dept: GASTROENTEROLOGY | Facility: CLINIC | Age: 71
End: 2021-11-02

## 2021-11-02 NOTE — TELEPHONE ENCOUNTER
FAST TRACK - LAST COLONOSCOPY 2014, DR. PETERSON - PERSONAL HISTORY POLYPS - NO FAM HX CRC/P - SCHEDULE AT [facility].

## 2021-11-11 ENCOUNTER — TRANSCRIBE ORDERS (OUTPATIENT)
Dept: ADMINISTRATIVE | Facility: HOSPITAL | Age: 71
End: 2021-11-11

## 2021-11-11 DIAGNOSIS — Z78.0 POST-MENOPAUSAL: Primary | ICD-10-CM

## 2021-11-12 ENCOUNTER — PREP FOR SURGERY (OUTPATIENT)
Dept: OTHER | Facility: HOSPITAL | Age: 71
End: 2021-11-12

## 2021-11-12 DIAGNOSIS — Z86.010 PERSONAL HISTORY OF COLONIC POLYPS: Primary | ICD-10-CM

## 2021-11-16 PROBLEM — Z86.0100 PERSONAL HISTORY OF COLONIC POLYPS: Status: ACTIVE | Noted: 2021-11-16

## 2021-11-16 PROBLEM — Z86.010 PERSONAL HISTORY OF COLONIC POLYPS: Status: ACTIVE | Noted: 2021-11-16

## 2021-11-16 NOTE — TELEPHONE ENCOUNTER
Spoke with Avi, .  Scheduled at [facility] on 04/08/2022 at 1:15pm - arrive 12pm.  Will e-mail instructions [email or web address] today.    COVID test on 04/06/2022, will call with time.  Need to self quarantine until after procedure.  He understands.

## 2021-11-17 ENCOUNTER — APPOINTMENT (OUTPATIENT)
Dept: BONE DENSITY | Facility: HOSPITAL | Age: 71
End: 2021-11-17

## 2021-11-17 DIAGNOSIS — Z78.0 POST-MENOPAUSAL: ICD-10-CM

## 2021-11-17 PROCEDURE — 77080 DXA BONE DENSITY AXIAL: CPT

## 2021-12-06 ENCOUNTER — APPOINTMENT (OUTPATIENT)
Dept: BONE DENSITY | Facility: HOSPITAL | Age: 71
End: 2021-12-06

## 2022-04-07 ENCOUNTER — ANESTHESIA EVENT (OUTPATIENT)
Dept: PERIOP | Facility: HOSPITAL | Age: 72
End: 2022-04-07

## 2022-04-08 ENCOUNTER — HOSPITAL ENCOUNTER (OUTPATIENT)
Facility: HOSPITAL | Age: 72
Setting detail: HOSPITAL OUTPATIENT SURGERY
Discharge: HOME | End: 2022-04-08
Attending: INTERNAL MEDICINE | Admitting: INTERNAL MEDICINE

## 2022-04-08 ENCOUNTER — ANESTHESIA (OUTPATIENT)
Dept: PERIOP | Facility: HOSPITAL | Age: 72
End: 2022-04-08

## 2022-04-08 VITALS
DIASTOLIC BLOOD PRESSURE: 87 MMHG | HEART RATE: 63 BPM | TEMPERATURE: 98.3 F | OXYGEN SATURATION: 98 % | BODY MASS INDEX: 30.12 KG/M2 | WEIGHT: 170 LBS | SYSTOLIC BLOOD PRESSURE: 129 MMHG | RESPIRATION RATE: 15 BRPM | HEIGHT: 63 IN

## 2022-04-08 DIAGNOSIS — Z86.010 PERSONAL HISTORY OF COLONIC POLYPS: ICD-10-CM

## 2022-04-08 LAB — POTASSIUM SERPL-SCNC: 3.8 MMOL/L (ref 3.5–5.2)

## 2022-04-08 PROCEDURE — 25010000002 PROPOFOL 10 MG/ML EMULSION: Performed by: NURSE ANESTHETIST, CERTIFIED REGISTERED

## 2022-04-08 PROCEDURE — 88305 TISSUE EXAM BY PATHOLOGIST: CPT | Performed by: INTERNAL MEDICINE

## 2022-04-08 PROCEDURE — 45380 COLONOSCOPY AND BIOPSY: CPT | Performed by: INTERNAL MEDICINE

## 2022-04-08 PROCEDURE — 84132 ASSAY OF SERUM POTASSIUM: CPT | Performed by: NURSE ANESTHETIST, CERTIFIED REGISTERED

## 2022-04-08 RX ORDER — SODIUM CHLORIDE, SODIUM LACTATE, POTASSIUM CHLORIDE, CALCIUM CHLORIDE 600; 310; 30; 20 MG/100ML; MG/100ML; MG/100ML; MG/100ML
9 INJECTION, SOLUTION INTRAVENOUS CONTINUOUS
Status: DISCONTINUED | OUTPATIENT
Start: 2022-04-08 | End: 2022-04-08 | Stop reason: HOSPADM

## 2022-04-08 RX ORDER — MAGNESIUM HYDROXIDE 1200 MG/15ML
LIQUID ORAL AS NEEDED
Status: DISCONTINUED | OUTPATIENT
Start: 2022-04-08 | End: 2022-04-08 | Stop reason: HOSPADM

## 2022-04-08 RX ORDER — MULTIVITAMIN WITH IRON
250 TABLET ORAL DAILY
COMMUNITY

## 2022-04-08 RX ORDER — SODIUM CHLORIDE 9 MG/ML
40 INJECTION, SOLUTION INTRAVENOUS AS NEEDED
Status: DISCONTINUED | OUTPATIENT
Start: 2022-04-08 | End: 2022-04-08 | Stop reason: HOSPADM

## 2022-04-08 RX ORDER — LIDOCAINE HYDROCHLORIDE 20 MG/ML
INJECTION, SOLUTION INFILTRATION; PERINEURAL AS NEEDED
Status: DISCONTINUED | OUTPATIENT
Start: 2022-04-08 | End: 2022-04-08 | Stop reason: SURG

## 2022-04-08 RX ORDER — PROPOFOL 10 MG/ML
VIAL (ML) INTRAVENOUS AS NEEDED
Status: DISCONTINUED | OUTPATIENT
Start: 2022-04-08 | End: 2022-04-08 | Stop reason: SURG

## 2022-04-08 RX ORDER — BISOPROLOL FUMARATE AND HYDROCHLOROTHIAZIDE 10; 6.25 MG/1; MG/1
1 TABLET ORAL DAILY
COMMUNITY

## 2022-04-08 RX ORDER — MULTIPLE VITAMINS W/ MINERALS TAB 9MG-400MCG
1 TAB ORAL DAILY
COMMUNITY

## 2022-04-08 RX ORDER — SODIUM CHLORIDE 0.9 % (FLUSH) 0.9 %
10 SYRINGE (ML) INJECTION AS NEEDED
Status: DISCONTINUED | OUTPATIENT
Start: 2022-04-08 | End: 2022-04-08 | Stop reason: HOSPADM

## 2022-04-08 RX ORDER — SODIUM CHLORIDE 0.9 % (FLUSH) 0.9 %
10 SYRINGE (ML) INJECTION EVERY 12 HOURS SCHEDULED
Status: DISCONTINUED | OUTPATIENT
Start: 2022-04-08 | End: 2022-04-08 | Stop reason: HOSPADM

## 2022-04-08 RX ORDER — LIDOCAINE HYDROCHLORIDE 10 MG/ML
0.5 INJECTION, SOLUTION EPIDURAL; INFILTRATION; INTRACAUDAL; PERINEURAL ONCE AS NEEDED
Status: DISCONTINUED | OUTPATIENT
Start: 2022-04-08 | End: 2022-04-08 | Stop reason: HOSPADM

## 2022-04-08 RX ORDER — GLYCOPYRROLATE 0.2 MG/ML
INJECTION INTRAMUSCULAR; INTRAVENOUS AS NEEDED
Status: DISCONTINUED | OUTPATIENT
Start: 2022-04-08 | End: 2022-04-08 | Stop reason: SURG

## 2022-04-08 RX ORDER — ATORVASTATIN CALCIUM 10 MG/1
10 TABLET, FILM COATED ORAL DAILY
COMMUNITY

## 2022-04-08 RX ADMIN — PROPOFOL 50 MG: 10 INJECTION, EMULSION INTRAVENOUS at 14:06

## 2022-04-08 RX ADMIN — PROPOFOL 50 MG: 10 INJECTION, EMULSION INTRAVENOUS at 14:25

## 2022-04-08 RX ADMIN — PROPOFOL 50 MG: 10 INJECTION, EMULSION INTRAVENOUS at 14:20

## 2022-04-08 RX ADMIN — PROPOFOL 50 MG: 10 INJECTION, EMULSION INTRAVENOUS at 14:15

## 2022-04-08 RX ADMIN — SODIUM CHLORIDE, POTASSIUM CHLORIDE, SODIUM LACTATE AND CALCIUM CHLORIDE 9 ML/HR: 600; 310; 30; 20 INJECTION, SOLUTION INTRAVENOUS at 12:36

## 2022-04-08 RX ADMIN — LIDOCAINE HYDROCHLORIDE 100 MG: 20 INJECTION, SOLUTION INFILTRATION; PERINEURAL at 14:06

## 2022-04-08 RX ADMIN — GLYCOPYRROLATE 0.2 MG: 0.2 INJECTION INTRAMUSCULAR; INTRAVENOUS at 14:06

## 2022-04-08 RX ADMIN — PROPOFOL 50 MG: 10 INJECTION, EMULSION INTRAVENOUS at 14:10

## 2022-04-08 NOTE — BRIEF OP NOTE
COLONOSCOPY WITH POLYPECTOMY  Progress Note    Nereida CUMMINGS  4/8/2022    Pre-op Diagnosis:   Personal history of colonic polyps [Z86.010]       Post-Op Diagnosis Codes:     * Personal history of colonic polyps [Z86.010]     * Lipoma of colon [D17.5]     * Diverticulosis [K57.90]     * Colon polyp [K63.5]    Procedure/CPT® Codes:        Procedure(s):  COLONOSCOPY WITH POLYPECTOMY    Surgeon(s):  Prince Hernandez MD    Anesthesia: Monitored Anesthesia Care    Staff:   Circulator: Mari Donahue RN  Scrub Person: Rubi Harris         Estimated Blood Loss: none    Urine Voided: * No values recorded between 4/8/2022  2:04 PM and 4/8/2022  2:33 PM *    Specimens:                Specimens     ID Source Type Tests Collected By Collected At Frozen?    A Large Intestine, Sigmoid Colon Polyp · TISSUE PATHOLOGY EXAM   Prince Hernandez MD 4/8/22 1428     Description: sigmoid colon polyps x2    This specimen was not marked as sent.    B Large Intestine, Rectum Polyp · TISSUE PATHOLOGY EXAM   Prince Hernandez MD 4/8/22 1432     Description: rectal polyp    This specimen was not marked as sent.                Drains: * No LDAs found *    Findings: Colon to TI good Prep  Sigmoid Diverticulosis  Polyps-3-Biopsy    Complications: None          Prince Hernandez MD     Date: 4/8/2022  Time: 14:36 EDT

## 2022-04-08 NOTE — ANESTHESIA POSTPROCEDURE EVALUATION
Patient: Nereida CUMMINGS    Procedure Summary     Date: 04/08/22 Room / Location: [facility] ENDOSCOPY 1 /  [facility] OR    Anesthesia Start: 1405 Anesthesia Stop: 1435    Procedure: COLONOSCOPY WITH POLYPECTOMY (N/A ) Diagnosis:       Personal history of colonic polyps      Lipoma of colon      Diverticulosis      Colon polyp      (Personal history of colonic polyps [Z86.010])    Surgeons: Prince Hernandez MD Provider: Yves Dukes CRNA    Anesthesia Type: MAC ASA Status: 2          Anesthesia Type: MAC    Vitals  Vitals Value Taken Time   /87 04/08/22 1516   Temp     Pulse 63 04/08/22 1516   Resp 15 04/08/22 1516   SpO2 98 % 04/08/22 1516           Post Anesthesia Care and Evaluation    Patient location during evaluation: bedside  Patient participation: complete - patient participated  Level of consciousness: awake and alert  Pain score: 0  Pain management: adequate  Airway patency: patent  Anesthetic complications: No anesthetic complications  PONV Status: none  Cardiovascular status: acceptable  Respiratory status: acceptable  Hydration status: acceptable

## 2022-04-08 NOTE — ANESTHESIA PREPROCEDURE EVALUATION
Anesthesia Evaluation     Patient summary reviewed and Nursing notes reviewed   no history of anesthetic complications:  NPO Solid Status: > 8 hours  NPO Liquid Status: > 8 hours           Airway   Mallampati: III  TM distance: >3 FB  Neck ROM: full  No difficulty expected and Small opening  Dental - normal exam     Pulmonary - negative pulmonary ROS and normal exam   Cardiovascular   Exercise tolerance: good (4-7 METS)    Rhythm: regular  Rate: normal    (+) hypertension well controlled less than 2 medications, hyperlipidemia,       Neuro/Psych- negative ROS  GI/Hepatic/Renal/Endo    (+) obesity,       Musculoskeletal     (+) back pain, chronic pain,   Abdominal   (+) obese,    Substance History   (+) alcohol use,      OB/GYN negative ob/gyn ROS         Other   arthritis,    history of cancer remission                    Anesthesia Plan    ASA 2     MAC     intravenous induction     Anesthetic plan, all risks, benefits, and alternatives have been provided, discussed and informed consent has been obtained with: patient.  Use of blood products discussed with patient  Consented to blood products.       CODE STATUS:

## 2022-04-08 NOTE — H&P
Patient Care Team:  Ariana Diaz APRN as PCP - General (Family Medicine)  Donovan Corado MD as PCP - Family Medicine    CHIEF COMPLAINT: Personal hx colon polyps    HISTORY OF PRESENT ILLNESS:  Last exam was 2014    Past Medical History:   Diagnosis Date   • Hyperlipidemia    • Hypertension    • Uterine cancer (HCC)      Past Surgical History:   Procedure Laterality Date   • HYSTERECTOMY     • OTHER SURGICAL HISTORY      brain mass removed     History reviewed. No pertinent family history.  Social History     Tobacco Use   • Smoking status: Never Smoker   Vaping Use   • Vaping Use: Never used   Substance Use Topics   • Alcohol use: Yes     Comment: rarely   • Drug use: Never     Medications Prior to Admission   Medication Sig Dispense Refill Last Dose   • atorvastatin (LIPITOR) 10 MG tablet Take 10 mg by mouth Daily.   4/7/2022 at 0800   • bisoprolol-hydrochlorothiazide (ZIAC) 10-6.25 MG per tablet Take 1 tablet by mouth Daily.   4/8/2022 at 0800   • Calcium Polycarbophil (FIBER-CAPS PO) Take 3 capsules by mouth 2 (Two) Times a Day.   4/6/2022   • CINNAMON PO Take 1,000 mg by mouth 2 (Two) Times a Day.   4/6/2022   • losartan-hydrochlorothiazide (HYZAAR) 100-25 MG per tablet    4/8/2022 at 0800   • Magnesium 250 MG tablet Take 250 mg by mouth Daily.   4/6/2022   • multivitamin with minerals tablet tablet Take 1 tablet by mouth Daily.   4/6/2022   • Omega-3 Fatty Acids (OMEGA-3 FISH OIL PO) Take 2,400 mg by mouth 2 (Two) Times a Day.   4/6/2022   • RUTIN PO Take 500 mg by mouth 2 (Two) Times a Day.   4/6/2022     Allergies:  Sulfa antibiotics    REVIEW OF SYSTEMS:  Please see the above history of present illness for pertinent positives and negatives.  The remainder of the patient's systems have been reviewed and are negative.     Vital Signs  Temp:  [98.3 °F (36.8 °C)] 98.3 °F (36.8 °C)  Heart Rate:  [63] 63  Resp:  [14] 14  BP: (149)/(86) 149/86    Flowsheet Rows    Flowsheet Row First Filed Value   Admission  "Height 160 cm (63\") Documented at 04/07/2022 1024   Admission Weight 77.1 kg (170 lb) Documented at 04/08/2022 1227           Physical Exam:  Physical Exam   Constitutional: Patient appears well-developed and well-nourished and in no acute distress   HEENT:   Head: Normocephalic and atraumatic.   Eyes:  Pupils are equal, round, and reactive to light. EOM are intact. Sclerae are anicteric and non-injected.  Mouth and Throat: Patient has moist mucous membranes. Oropharynx is clear of any erythema or exudate.     Neck: Neck supple. No JVD present. No thyromegaly present. No lymphadenopathy present.  Cardiovascular: Regular rate, regular rhythm, S1 normal and S2 normal.  Exam reveals no gallop and no friction rub.  No murmur heard.  Pulmonary/Chest: Lungs are clear to auscultation bilaterally. No respiratory distress. No wheezes. No rhonchi. No rales.   Abdominal: Soft. Bowel sounds are normal. No distension and no mass. There is no hepatosplenomegaly. There is no tenderness.   Musculoskeletal: Normal Muscle tone  Extremities: No edema. Pulses are palpable in all 4 extremities.  Neurological: Patient is alert and oriented to person, place, and time. Cranial nerves II-XII are grossly intact with no focal deficits.  Skin: Skin is warm. No rash noted. Nails show no clubbing.  No cyanosis or erythema.    Debilities/Disabilities Identified: None  Emotional Behavior: Appropriate     Results Review:   I reviewed the patient's new clinical results.    Lab Results (most recent)     Procedure Component Value Units Date/Time    Potassium [[id number]]  (Normal) Collected: 04/08/22 1223    Specimen: Blood Updated: 04/08/22 1240     Potassium 3.8 mmol/L      Comment: Slight hemolysis detected by analyzer. Results may be affected.             Imaging Results (Most Recent)     None        reviewed    ECG/EMG Results (most recent)     None        reviewed    Assessment/Plan   Personal hx colon polyps/  colonoscopy      I discussed the " patient's findings and my recommendations with patient.     Prince Hernandez MD  04/08/22  14:00 EDT    Time: 10 min prior to procedure.

## 2022-04-11 LAB
LAB AP CASE REPORT: NORMAL
PATH REPORT.FINAL DX SPEC: NORMAL
PATH REPORT.GROSS SPEC: NORMAL

## 2023-11-09 ENCOUNTER — TRANSCRIBE ORDERS (OUTPATIENT)
Dept: ADMINISTRATIVE | Facility: HOSPITAL | Age: 73
End: 2023-11-09
Payer: MEDICARE

## 2023-11-09 DIAGNOSIS — M81.0 OSTEOPOROSIS, POST-MENOPAUSAL: Primary | ICD-10-CM

## 2024-01-04 ENCOUNTER — APPOINTMENT (OUTPATIENT)
Dept: BONE DENSITY | Facility: HOSPITAL | Age: 74
End: 2024-01-04
Payer: MEDICARE

## 2024-01-04 DIAGNOSIS — M81.0 OSTEOPOROSIS, POST-MENOPAUSAL: ICD-10-CM

## 2024-01-04 PROCEDURE — 77080 DXA BONE DENSITY AXIAL: CPT

## 2024-07-30 ENCOUNTER — TRANSCRIBE ORDERS (OUTPATIENT)
Dept: ADMINISTRATIVE | Facility: HOSPITAL | Age: 74
End: 2024-07-30
Payer: MEDICARE

## 2024-07-30 ENCOUNTER — LAB (OUTPATIENT)
Dept: LAB | Facility: HOSPITAL | Age: 74
End: 2024-07-30
Payer: MEDICARE

## 2024-07-30 DIAGNOSIS — E83.52 HYPERCALCEMIA SYNDROME: ICD-10-CM

## 2024-07-30 DIAGNOSIS — E83.52 HYPERCALCEMIA SYNDROME: Primary | ICD-10-CM

## 2024-07-30 LAB
25(OH)D3 SERPL-MCNC: 34 NG/ML (ref 30–100)
CA-I BLD-MCNC: 4.98 MG/DL (ref 4.6–5.4)
CALCIUM SPEC-SCNC: 10.3 MG/DL (ref 8.6–10.5)
Lab: NORMAL
PTH-INTACT SERPL-MCNC: 62 PG/ML (ref 15–65)

## 2024-07-30 PROCEDURE — 36415 COLL VENOUS BLD VENIPUNCTURE: CPT

## 2024-07-30 PROCEDURE — 82330 ASSAY OF CALCIUM: CPT | Performed by: OTOLARYNGOLOGY

## 2024-07-30 PROCEDURE — 82306 VITAMIN D 25 HYDROXY: CPT

## 2024-07-30 PROCEDURE — 83970 ASSAY OF PARATHORMONE: CPT

## 2024-07-30 PROCEDURE — 82310 ASSAY OF CALCIUM: CPT

## 2024-10-25 ENCOUNTER — LAB (OUTPATIENT)
Dept: LAB | Facility: HOSPITAL | Age: 74
End: 2024-10-25
Payer: MEDICARE

## 2024-10-25 ENCOUNTER — TRANSCRIBE ORDERS (OUTPATIENT)
Dept: ADMINISTRATIVE | Facility: HOSPITAL | Age: 74
End: 2024-10-25
Payer: MEDICARE

## 2024-10-25 DIAGNOSIS — E21.5 PARATHYROID DISEASE: ICD-10-CM

## 2024-10-25 DIAGNOSIS — E83.52 HYPERCALCEMIA: ICD-10-CM

## 2024-10-25 DIAGNOSIS — E55.9 AVITAMINOSIS D: Primary | ICD-10-CM

## 2024-10-25 DIAGNOSIS — E55.9 AVITAMINOSIS D: ICD-10-CM

## 2024-10-25 LAB
25(OH)D3 SERPL-MCNC: 28.7 NG/ML (ref 30–100)
CA-I BLD-MCNC: 5.14 MG/DL (ref 4.6–5.4)
CALCIUM SPEC-SCNC: 10.8 MG/DL (ref 8.6–10.5)
Lab: NORMAL
PTH-INTACT SERPL-MCNC: 53.4 PG/ML (ref 15–65)

## 2024-10-25 PROCEDURE — 83970 ASSAY OF PARATHORMONE: CPT

## 2024-10-25 PROCEDURE — 36415 COLL VENOUS BLD VENIPUNCTURE: CPT

## 2024-10-25 PROCEDURE — 82306 VITAMIN D 25 HYDROXY: CPT

## 2024-10-25 PROCEDURE — 82330 ASSAY OF CALCIUM: CPT | Performed by: OTOLARYNGOLOGY

## 2024-10-25 PROCEDURE — 82310 ASSAY OF CALCIUM: CPT

## 2024-10-28 ENCOUNTER — LAB (OUTPATIENT)
Dept: LAB | Facility: HOSPITAL | Age: 74
End: 2024-10-28
Payer: MEDICARE

## 2024-10-28 DIAGNOSIS — E83.52 HYPERCALCEMIA SYNDROME: ICD-10-CM

## 2024-10-28 LAB
CALCIUM 24H UR-MCNC: 15.5 MG/DL
CALCIUM 24H UR-MRATE: 221.7 MG/24 HR (ref 100–300)
COLLECT DURATION TIME UR: 24 HRS
SPECIMEN VOL 24H UR: 1430 ML

## 2024-10-28 PROCEDURE — 82340 ASSAY OF CALCIUM IN URINE: CPT

## 2024-10-28 PROCEDURE — 81050 URINALYSIS VOLUME MEASURE: CPT

## 2025-05-19 ENCOUNTER — LAB (OUTPATIENT)
Dept: LAB | Facility: HOSPITAL | Age: 75
End: 2025-05-19
Payer: MEDICARE

## 2025-05-19 ENCOUNTER — TRANSCRIBE ORDERS (OUTPATIENT)
Dept: ADMINISTRATIVE | Facility: HOSPITAL | Age: 75
End: 2025-05-19
Payer: MEDICARE

## 2025-05-19 DIAGNOSIS — E83.52 HYPERCALCEMIA: Primary | ICD-10-CM

## 2025-05-19 DIAGNOSIS — E21.5 PARATHYROID DISEASE: ICD-10-CM

## 2025-05-19 DIAGNOSIS — E55.9 AVITAMINOSIS D: ICD-10-CM

## 2025-05-19 DIAGNOSIS — E83.52 HYPERCALCEMIA: ICD-10-CM

## 2025-05-19 LAB
25(OH)D3 SERPL-MCNC: 29.7 NG/ML (ref 30–100)
CA-I BLD-MCNC: 5.3 MG/DL (ref 4.6–5.4)
CALCIUM SPEC-SCNC: 11 MG/DL (ref 8.6–10.5)
Lab: NORMAL
PTH-INTACT SERPL-MCNC: 43.4 PG/ML (ref 15–65)

## 2025-05-19 PROCEDURE — 82310 ASSAY OF CALCIUM: CPT

## 2025-05-19 PROCEDURE — 36415 COLL VENOUS BLD VENIPUNCTURE: CPT

## 2025-05-19 PROCEDURE — 82330 ASSAY OF CALCIUM: CPT

## 2025-05-19 PROCEDURE — 83970 ASSAY OF PARATHORMONE: CPT

## 2025-05-19 PROCEDURE — 82306 VITAMIN D 25 HYDROXY: CPT

## 2025-05-21 ENCOUNTER — LAB (OUTPATIENT)
Dept: LAB | Facility: HOSPITAL | Age: 75
End: 2025-05-21
Payer: MEDICARE

## 2025-05-21 DIAGNOSIS — E83.52 HYPERCALCEMIA SYNDROME: ICD-10-CM

## 2025-05-21 LAB
CALCIUM 24H UR-MCNC: 19 MG/DL
CALCIUM 24H UR-MRATE: 256.5 MG/24 HR (ref 100–300)
COLLECT DURATION TIME UR: 24 HRS
SPECIMEN VOL 24H UR: 1350 ML

## 2025-08-14 ENCOUNTER — LAB (OUTPATIENT)
Dept: LAB | Facility: HOSPITAL | Age: 75
End: 2025-08-14
Payer: MEDICARE

## 2025-08-14 ENCOUNTER — TRANSCRIBE ORDERS (OUTPATIENT)
Dept: ADMINISTRATIVE | Facility: HOSPITAL | Age: 75
End: 2025-08-14
Payer: MEDICARE

## 2025-08-14 DIAGNOSIS — E21.5 PARATHYROID DISEASE: ICD-10-CM

## 2025-08-14 DIAGNOSIS — E83.52 HYPERCALCEMIA SYNDROME: Primary | ICD-10-CM

## 2025-08-14 DIAGNOSIS — E83.52 HYPERCALCEMIA SYNDROME: ICD-10-CM

## 2025-08-14 LAB
CA-I BLD-MCNC: 5.03 MG/DL (ref 4.6–5.4)
CALCIUM SPEC-SCNC: 11.2 MG/DL (ref 8.6–10.5)
Lab: NORMAL
PTH-INTACT SERPL-MCNC: 50.2 PG/ML (ref 15–65)

## 2025-08-14 PROCEDURE — 82330 ASSAY OF CALCIUM: CPT

## 2025-08-14 PROCEDURE — 82310 ASSAY OF CALCIUM: CPT

## 2025-08-14 PROCEDURE — 83970 ASSAY OF PARATHORMONE: CPT

## 2025-08-14 PROCEDURE — 36415 COLL VENOUS BLD VENIPUNCTURE: CPT

## (undated) DEVICE — SPNG GZ WOVN 4X4IN 12PLY 10/BX STRL

## (undated) DEVICE — ADAPT CLN BIOGUARD AIR/H2O DISP

## (undated) DEVICE — KT ORCA ORCAPOD DISP STRL

## (undated) DEVICE — Device

## (undated) DEVICE — VIAL FORMALIN CAP 10P 40ML

## (undated) DEVICE — FRCP BX RADJAW4 NDL 2.8 240CM LG OG BX40

## (undated) DEVICE — SYR LL 3CC

## (undated) DEVICE — SAFELINER SUCTION CANISTER 1000CC: Brand: DEROYAL

## (undated) DEVICE — BW-412T DISP COMBO CLEANING BRUSH: Brand: SINGLE USE COMBINATION CLEANING BRUSH

## (undated) DEVICE — GLV SURG SENSICARE PI MIC PF SZ7.5 LF STRL

## (undated) DEVICE — JACKT LAB F/R KNIT CUFF/COLR XLG BLU